# Patient Record
Sex: FEMALE | Race: WHITE | URBAN - METROPOLITAN AREA
[De-identification: names, ages, dates, MRNs, and addresses within clinical notes are randomized per-mention and may not be internally consistent; named-entity substitution may affect disease eponyms.]

---

## 2020-07-17 ENCOUNTER — HOSPITAL ENCOUNTER (OUTPATIENT)
Age: 40
Setting detail: SPECIMEN
Discharge: HOME | End: 2020-07-17
Payer: MEDICARE

## 2020-07-18 LAB
ABSOLUTE EOS #: 0.16 K/UL (ref 0–0.44)
ABSOLUTE IMMATURE GRANULOCYTE: 0.03 K/UL (ref 0–0.3)
ABSOLUTE LYMPH #: 3.31 K/UL (ref 1.1–3.7)
ABSOLUTE MONO #: 0.59 K/UL (ref 0.1–1.2)
ALBUMIN SERPL-MCNC: 4.4 G/DL (ref 3.5–5.2)
ALBUMIN/GLOBULIN RATIO: 1.9 (ref 1–2.5)
ALP BLD-CCNC: 67 U/L (ref 35–104)
ALT SERPL-CCNC: 9 U/L (ref 5–33)
ANION GAP SERPL CALCULATED.3IONS-SCNC: 15 MMOL/L (ref 9–17)
AST SERPL-CCNC: 12 U/L
BASOPHILS # BLD: 1 % (ref 0–2)
BASOPHILS ABSOLUTE: 0.05 K/UL (ref 0–0.2)
BILIRUB SERPL-MCNC: 0.57 MG/DL (ref 0.3–1.2)
BUN BLDV-MCNC: 9 MG/DL (ref 6–20)
BUN/CREAT BLD: NORMAL (ref 9–20)
CALCIUM SERPL-MCNC: 9.5 MG/DL (ref 8.6–10.4)
CHLORIDE BLD-SCNC: 103 MMOL/L (ref 98–107)
CHOLESTEROL, FASTING: 187 MG/DL
CHOLESTEROL/HDL RATIO: 4.9
CO2: 21 MMOL/L (ref 20–31)
CREAT SERPL-MCNC: 0.68 MG/DL (ref 0.5–0.9)
DIFFERENTIAL TYPE: NORMAL
EOSINOPHILS RELATIVE PERCENT: 2 % (ref 1–4)
GFR AFRICAN AMERICAN: >60 ML/MIN
GFR NON-AFRICAN AMERICAN: >60 ML/MIN
GFR SERPL CREATININE-BSD FRML MDRD: NORMAL ML/MIN/{1.73_M2}
GFR SERPL CREATININE-BSD FRML MDRD: NORMAL ML/MIN/{1.73_M2}
GLUCOSE BLD-MCNC: 95 MG/DL (ref 70–99)
HCT VFR BLD CALC: 42.2 % (ref 36.3–47.1)
HDLC SERPL-MCNC: 38 MG/DL
HEMOGLOBIN: 13.7 G/DL (ref 11.9–15.1)
IMMATURE GRANULOCYTES: 0 %
LDL CHOLESTEROL: 126 MG/DL (ref 0–130)
LYMPHOCYTES # BLD: 34 % (ref 24–43)
MCH RBC QN AUTO: 30.8 PG (ref 25.2–33.5)
MCHC RBC AUTO-ENTMCNC: 32.5 G/DL (ref 28.4–34.8)
MCV RBC AUTO: 94.8 FL (ref 82.6–102.9)
MONOCYTES # BLD: 6 % (ref 3–12)
NRBC AUTOMATED: 0 PER 100 WBC
PDW BLD-RTO: 12.9 % (ref 11.8–14.4)
PLATELET # BLD: 207 K/UL (ref 138–453)
PLATELET ESTIMATE: NORMAL
PMV BLD AUTO: 12.4 FL (ref 8.1–13.5)
POTASSIUM SERPL-SCNC: 3.9 MMOL/L (ref 3.7–5.3)
RBC # BLD: 4.45 M/UL (ref 3.95–5.11)
RBC # BLD: NORMAL 10*6/UL
SEG NEUTROPHILS: 57 % (ref 36–65)
SEGMENTED NEUTROPHILS ABSOLUTE COUNT: 5.47 K/UL (ref 1.5–8.1)
SODIUM BLD-SCNC: 139 MMOL/L (ref 135–144)
TOTAL PROTEIN: 6.7 G/DL (ref 6.4–8.3)
TRIGLYCERIDE, FASTING: 117 MG/DL
TSH SERPL DL<=0.05 MIU/L-ACNC: 1.2 MIU/L (ref 0.3–5)
VLDLC SERPL CALC-MCNC: ABNORMAL MG/DL (ref 1–30)
WBC # BLD: 9.6 K/UL (ref 3.5–11.3)
WBC # BLD: NORMAL 10*3/UL

## 2020-07-19 LAB
ESTIMATED AVERAGE GLUCOSE: 105 MG/DL
HBA1C MFR BLD: 5.3 % (ref 4–6)

## 2020-07-24 ENCOUNTER — HOSPITAL ENCOUNTER (OUTPATIENT)
Age: 40
Setting detail: SPECIMEN
Discharge: HOME | End: 2020-07-24
Payer: MEDICARE

## 2020-07-27 LAB
DIRECT EXAM: ABNORMAL
Lab: ABNORMAL
SPECIMEN DESCRIPTION: ABNORMAL

## 2020-07-29 LAB
CYTOLOGY REPORT: NORMAL
HPV SAMPLE: ABNORMAL
HPV, GENOTYPE 16: DETECTED
HPV, GENOTYPE 18: NOT DETECTED
HPV, HIGH RISK OTHER: NOT DETECTED
HPV, INTERPRETATION: ABNORMAL
SPECIMEN DESCRIPTION: ABNORMAL

## 2020-10-23 ENCOUNTER — HOSPITAL ENCOUNTER (OUTPATIENT)
Age: 40
Setting detail: SPECIMEN
Discharge: HOME | End: 2020-10-23
Payer: MEDICARE

## 2020-10-27 LAB — SURGICAL PATHOLOGY REPORT: NORMAL

## 2023-01-06 ENCOUNTER — HOSPITAL ENCOUNTER (OUTPATIENT)
Age: 43
Setting detail: SPECIMEN
Discharge: HOME | End: 2023-01-06

## 2023-01-07 LAB
ABSOLUTE EOS #: 0.09 K/UL (ref 0–0.44)
ABSOLUTE IMMATURE GRANULOCYTE: 0.03 K/UL (ref 0–0.3)
ABSOLUTE LYMPH #: 3.41 K/UL (ref 1.1–3.7)
ABSOLUTE MONO #: 0.6 K/UL (ref 0.1–1.2)
ALBUMIN SERPL-MCNC: 4 G/DL (ref 3.5–5.2)
ALBUMIN/GLOBULIN RATIO: 1.7 (ref 1–2.5)
ALP BLD-CCNC: 73 U/L (ref 35–104)
ALT SERPL-CCNC: 6 U/L (ref 5–33)
ANION GAP SERPL CALCULATED.3IONS-SCNC: 9 MMOL/L (ref 9–17)
AST SERPL-CCNC: 9 U/L
BASOPHILS # BLD: 0 % (ref 0–2)
BASOPHILS ABSOLUTE: 0.04 K/UL (ref 0–0.2)
BILIRUB SERPL-MCNC: 0.3 MG/DL (ref 0.3–1.2)
BUN BLDV-MCNC: 16 MG/DL (ref 6–20)
C-REACTIVE PROTEIN: 4.7 MG/L (ref 0–5)
CALCIUM SERPL-MCNC: 9.3 MG/DL (ref 8.6–10.4)
CHLORIDE BLD-SCNC: 103 MMOL/L (ref 98–107)
CHOLESTEROL, FASTING: 195 MG/DL
CHOLESTEROL/HDL RATIO: 4.4
CO2: 24 MMOL/L (ref 20–31)
CREAT SERPL-MCNC: 0.66 MG/DL (ref 0.5–0.9)
EOSINOPHILS RELATIVE PERCENT: 1 % (ref 1–4)
GFR SERPL CREATININE-BSD FRML MDRD: >60 ML/MIN/1.73M2
GLUCOSE BLD-MCNC: 89 MG/DL (ref 70–99)
HCT VFR BLD CALC: 41.8 % (ref 36.3–47.1)
HDLC SERPL-MCNC: 44 MG/DL
HEMOGLOBIN: 13.8 G/DL (ref 11.9–15.1)
HIV AG/AB: NONREACTIVE
IMMATURE GRANULOCYTES: 0 %
LDL CHOLESTEROL: 135 MG/DL (ref 0–130)
LYMPHOCYTES # BLD: 36 % (ref 24–43)
MCH RBC QN AUTO: 32.5 PG (ref 25.2–33.5)
MCHC RBC AUTO-ENTMCNC: 33 G/DL (ref 28.4–34.8)
MCV RBC AUTO: 98.4 FL (ref 82.6–102.9)
MONOCYTES # BLD: 6 % (ref 3–12)
NRBC AUTOMATED: 0 PER 100 WBC
PDW BLD-RTO: 13.2 % (ref 11.8–14.4)
PLATELET # BLD: 223 K/UL (ref 138–453)
PMV BLD AUTO: 11.6 FL (ref 8.1–13.5)
POTASSIUM SERPL-SCNC: 4.4 MMOL/L (ref 3.7–5.3)
RBC # BLD: 4.25 M/UL (ref 3.95–5.11)
RHEUMATOID FACTOR: 19 IU/ML
SEDIMENTATION RATE, ERYTHROCYTE: 7 MM/HR (ref 0–20)
SEG NEUTROPHILS: 57 % (ref 36–65)
SEGMENTED NEUTROPHILS ABSOLUTE COUNT: 5.39 K/UL (ref 1.5–8.1)
SODIUM BLD-SCNC: 136 MMOL/L (ref 135–144)
T3 TOTAL: 123 NG/DL
TOTAL PROTEIN: 6.4 G/DL (ref 6.4–8.3)
TRIGLYCERIDE, FASTING: 81 MG/DL
TSH SERPL DL<=0.05 MIU/L-ACNC: 0.71 UIU/ML (ref 0.3–5)
URIC ACID: 4.3 MG/DL (ref 2.4–5.7)
WBC # BLD: 9.6 K/UL (ref 3.5–11.3)

## 2023-01-08 LAB
ANTI DNA DOUBLE STRANDED: <0.5 IU/ML
ANTI-NUCLEAR ANTIBODY (ANA): NEGATIVE
CCP IGG ANTIBODIES: <0.4 U/ML (ref 0–7)
ENA ANTIBODIES SCREEN: 0.2 U/ML

## 2023-01-09 ENCOUNTER — TELEPHONE (OUTPATIENT)
Dept: CARDIOLOGY CLINIC | Age: 43
End: 2023-01-09

## 2023-01-09 NOTE — TELEPHONE ENCOUNTER
Received a fax for a referral from [employer]. Attempted to call and schedule but had to leave .

## 2023-01-27 ENCOUNTER — OFFICE VISIT (OUTPATIENT)
Dept: CARDIOLOGY CLINIC | Age: 43
End: 2023-01-27

## 2023-01-27 VITALS
BODY MASS INDEX: 25.24 KG/M2 | DIASTOLIC BLOOD PRESSURE: 58 MMHG | SYSTOLIC BLOOD PRESSURE: 90 MMHG | HEIGHT: 59 IN | WEIGHT: 125.2 LBS | HEART RATE: 71 BPM

## 2023-01-27 DIAGNOSIS — R42 DIZZINESS, NONSPECIFIC: ICD-10-CM

## 2023-01-27 DIAGNOSIS — R01.1 SYSTOLIC EJECTION MURMUR: Primary | ICD-10-CM

## 2023-01-27 DIAGNOSIS — Z72.0 TOBACCO ABUSE: ICD-10-CM

## 2023-01-27 RX ORDER — CETIRIZINE HYDROCHLORIDE 10 MG/1
10 TABLET ORAL AS NEEDED
COMMUNITY

## 2023-01-27 RX ORDER — ALBUTEROL SULFATE 90 UG/1
2 AEROSOL, METERED RESPIRATORY (INHALATION) EVERY 6 HOURS PRN
COMMUNITY

## 2023-01-27 NOTE — PROGRESS NOTES
Chief Complaint   Patient presents with    New Patient     Referred from [facility]     Referred for Heart Murmur    Denied cp, sob, palpitation, or edema    Occasional dizziness supine, sitting or standing- in episodes once in 6 months    Hx of syncope  2009  followed with sizure like activityand evaluated then and none identified per pat    Smokes 1ppd for 22 yrs        FHX  Father had CABG at 72  Grandma had heart ds            Past Surgical History:   Procedure Laterality Date    BRAIN SURGERY  03/2016    removal of two tumors from [address]  11/09/2001    [address]  02/17/2004    [facility]  2012    [address] [facility] OF UTERUS  08/2009    [place] 50    TUBAL LIGATION  02/17/2004    [facility]       Allergies   Allergen Reactions    Topamax [Topiramate]     Codeine Nausea And Vomiting        History reviewed. No pertinent family history. Social History     Socioeconomic History    Marital status: Single     Spouse name: Not on file    Number of children: Not on file    Years of education: Not on file    Highest education level: Not on file   Occupational History    Not on file   Tobacco Use    Smoking status: Every Day     Packs/day: 0.50     Years: 22.00     Pack years: 11.00     Types: Cigarettes     Start date: 2000    Smokeless tobacco: Never   Substance and Sexual Activity    Alcohol use:  Yes     Alcohol/week: 1.0 standard drink     Types: 1 Standard drinks or equivalent per week    Drug use: Yes     Types: Marijuana Alejandra Shan)    Sexual activity: Not on file   Other Topics Concern    Not on file   Social History Narrative    Not on file     Social Determinants of Health     Financial Resource Strain: Not on file   Food Insecurity: Not on file   Transportation Needs: Not on file Physical Activity: Not on file   Stress: Not on file   Social Connections: Not on file   Intimate Partner Violence: Not on file   Housing Stability: Not on file       Current Outpatient Medications   Medication Sig Dispense Refill    albuterol sulfate HFA (VENTOLIN HFA) 108 (90 Base) MCG/ACT inhaler Inhale 2 puffs into the lungs every 6 hours as needed for Wheezing      cetirizine (ZYRTEC) 10 MG tablet Take 10 mg by mouth as needed for Allergies       No current facility-administered medications for this visit. Review of Systems -     General ROS: negative  Psychological ROS: negative  Hematological and Lymphatic ROS: No history of blood clots or bleeding disorder. Respiratory ROS: no cough,  or wheezing, the rest see HPI  Cardiovascular ROS: See HPI  Gastrointestinal ROS: negative  Genito-Urinary ROS: no dysuria, trouble voiding, or hematuria  Musculoskeletal ROS: negative  Neurological ROS: no TIA or stroke symptoms  Dermatological ROS: negative      Blood pressure (!) 90/58, pulse 71, height 4' 11\" (1.499 m), weight 125 lb 3.2 oz (56.8 kg).         Physical Examination:    General appearance - alert, well appearing, and in no distress  HEENT- Pink conjunctiva  , Non-icteri sclera,PERRLA  Mental status - alert, oriented to person, place, and time  Neck - supple, no significant adenopathy, no JVD, or carotid bruits  Chest - clear to auscultation, no wheezes, rales or rhonchi, symmetric air entry  Heart - normal rate, regular rhythm, normal S1, S2, no murmurs, rubs, clicks or gallops  Abdomen - soft, nontender, nondistended, no masses or organomegaly  NAMRATA- no CVA or flank tenderness, no suprapubic tenderness  Neurological - alert, oriented, normal speech, no focal findings or movement disorder noted  Musculoskeletal/limbs - no joint tenderness, deformity or swelling   - peripheral pulses normal, no pedal edema, no clubbing or cyanosis  Skin - normal coloration and turgor, no rashes, no suspicious skin lesions noted  Psych- appropriate mood and affect    Lab  No results for input(s): CKTOTAL, CKMB, CKMBINDEX, TROPONINI in the last 72 hours. CBC:   Lab Results   Component Value Date/Time    WBC 9.6 01/06/2023 03:15 PM    RBC 4.25 01/06/2023 03:15 PM    HGB 13.8 01/06/2023 03:15 PM    HCT 41.8 01/06/2023 03:15 PM    MCV 98.4 01/06/2023 03:15 PM    MCH 32.5 01/06/2023 03:15 PM    MCHC 33.0 01/06/2023 03:15 PM    RDW 13.2 01/06/2023 03:15 PM     01/06/2023 03:15 PM    MPV 11.6 01/06/2023 03:15 PM     BMP:    Lab Results   Component Value Date/Time     01/06/2023 03:15 PM    K 4.4 01/06/2023 03:15 PM     01/06/2023 03:15 PM    CO2 24 01/06/2023 03:15 PM    BUN 16 01/06/2023 03:15 PM    LABALBU 4.0 01/06/2023 03:15 PM    CREATININE 0.66 01/06/2023 03:15 PM    CALCIUM 9.3 01/06/2023 03:15 PM    GFRAA >60 07/17/2020 03:15 PM    LABGLOM >60 01/06/2023 03:15 PM    GLUCOSE 89 01/06/2023 03:15 PM     Hepatic Function Panel:    Lab Results   Component Value Date/Time    ALKPHOS 73 01/06/2023 03:15 PM    ALT 6 01/06/2023 03:15 PM    AST 9 01/06/2023 03:15 PM    PROT 6.4 01/06/2023 03:15 PM    BILITOT 0.3 01/06/2023 03:15 PM    LABALBU 4.0 01/06/2023 03:15 PM     Magnesium:  No results found for: MG  Warfarin PT/INR:  No components found for: PTPATWAR, PTINRWAR  HgBA1c:    Lab Results   Component Value Date/Time    LABA1C 5.3 07/17/2020 03:15 PM     FLP:    Lab Results   Component Value Date/Time    HDL 44 01/06/2023 03:15 PM     TSH:    Lab Results   Component Value Date/Time    TSH 0.71 01/06/2023 03:15 PM       Ekg 1/27/23  Sinus  Rhythm   WITHIN NORMAL LIMITS      Assessment   Diagnosis Orders   1. Systolic ejection murmur best in aortic area  ECHO Complete 2D W Doppler W Color      2. Dizziness, nonspecific  ECHO Complete 2D W Doppler W Color      3.  Tobacco abuse  ECHO Complete 2D W Doppler W Color          Plan     Continue the current treatment and with constant vigilance to changes in symptoms and also any potential side effects. Return for care or seek medical attention immediately if symptoms got worse and/or develop new symptoms. ESM best in aortic area 3/6  Probably function  Dizziness nonsp  Echo      Smoking: discussed with the patient the importance of smoke cessation especially with the risk of CAD. D/w the pat the plan of care    patient is advised to exercise 30 min s a day three times a week and about weight loss ,balance diet and     More fruits and vegetables . Discussed use, benefit, and side effects of prescribed medications. All patient questions answered. Pt voiced understanding. Instructed to continue current medications, diet and exercise. Continue risk factor modification and medical management. Patient agreed with treatment plan. Follow up as directed.     RTC 2 months      Bhupendra Roth, [facility]

## 2023-02-07 ENCOUNTER — HOSPITAL ENCOUNTER (OUTPATIENT)
Dept: NON INVASIVE DIAGNOSTICS | Age: 43
Discharge: HOME | End: 2023-02-07
Payer: MEDICAID

## 2023-02-07 DIAGNOSIS — R01.1 SYSTOLIC EJECTION MURMUR: ICD-10-CM

## 2023-02-07 DIAGNOSIS — R42 DIZZINESS, NONSPECIFIC: ICD-10-CM

## 2023-02-07 DIAGNOSIS — Z72.0 TOBACCO ABUSE: ICD-10-CM

## 2023-02-07 LAB
LV EF: 65 %
LVEF MODALITY: NORMAL

## 2023-02-07 PROCEDURE — 93306 TTE W/DOPPLER COMPLETE: CPT

## 2023-04-14 PROBLEM — I35.8 AORTIC DIASTOLIC MURMUR: Status: ACTIVE | Noted: 2023-04-14

## 2023-04-14 PROBLEM — I35.1 MODERATE AORTIC REGURGITATION: Status: ACTIVE | Noted: 2023-04-14

## 2023-05-02 ENCOUNTER — OFFICE VISIT (OUTPATIENT)
Dept: RHEUMATOLOGY | Age: 43
End: 2023-05-02
Payer: MEDICAID

## 2023-05-02 ENCOUNTER — HOSPITAL ENCOUNTER (OUTPATIENT)
Dept: GENERAL RADIOLOGY | Age: 43
Discharge: HOME | End: 2023-05-02
Payer: MEDICAID

## 2023-05-02 ENCOUNTER — HOSPITAL ENCOUNTER (OUTPATIENT)
Age: 43
Discharge: HOME | End: 2023-05-02
Payer: MEDICAID

## 2023-05-02 VITALS
HEIGHT: 59 IN | OXYGEN SATURATION: 99 % | WEIGHT: 120 LBS | HEART RATE: 64 BPM | SYSTOLIC BLOOD PRESSURE: 116 MMHG | BODY MASS INDEX: 24.19 KG/M2 | DIASTOLIC BLOOD PRESSURE: 74 MMHG

## 2023-05-02 DIAGNOSIS — R53.83 OTHER FATIGUE: Primary | ICD-10-CM

## 2023-05-02 DIAGNOSIS — M25.50 POLYARTHRALGIA: ICD-10-CM

## 2023-05-02 DIAGNOSIS — R63.4 WEIGHT LOSS: ICD-10-CM

## 2023-05-02 DIAGNOSIS — R76.8 RHEUMATOID FACTOR POSITIVE: ICD-10-CM

## 2023-05-02 DIAGNOSIS — R53.83 OTHER FATIGUE: ICD-10-CM

## 2023-05-02 DIAGNOSIS — B35.3 TINEA PEDIS OF LEFT FOOT: ICD-10-CM

## 2023-05-02 DIAGNOSIS — L60.1 ONYCHOLYSIS: ICD-10-CM

## 2023-05-02 LAB
ALBUMIN SERPL BCG-MCNC: 4.8 G/DL (ref 3.5–5.1)
ALP SERPL-CCNC: 73 U/L (ref 38–126)
ALT SERPL W/O P-5'-P-CCNC: < 5 U/L (ref 11–66)
ANION GAP SERPL CALC-SCNC: 16 MEQ/L (ref 8–16)
AST SERPL-CCNC: 11 U/L (ref 5–40)
BASOPHILS ABSOLUTE: 0 THOU/MM3 (ref 0–0.1)
BASOPHILS NFR BLD AUTO: 0.5 %
BILIRUB SERPL-MCNC: 0.7 MG/DL (ref 0.3–1.2)
BUN SERPL-MCNC: 10 MG/DL (ref 7–22)
CALCIUM SERPL-MCNC: 9.7 MG/DL (ref 8.5–10.5)
CHLORIDE SERPL-SCNC: 106 MEQ/L (ref 98–111)
CO2 SERPL-SCNC: 21 MEQ/L (ref 23–33)
CREAT SERPL-MCNC: 0.6 MG/DL (ref 0.4–1.2)
CRP SERPL-MCNC: < 0.3 MG/DL (ref 0–1)
DEPRECATED RDW RBC AUTO: 46.5 FL (ref 35–45)
EOSINOPHIL NFR BLD AUTO: 0.7 %
EOSINOPHILS ABSOLUTE: 0.1 THOU/MM3 (ref 0–0.4)
ERYTHROCYTE [DISTWIDTH] IN BLOOD BY AUTOMATED COUNT: 13.1 % (ref 11.5–14.5)
ERYTHROCYTE [SEDIMENTATION RATE] IN BLOOD BY WESTERGREN METHOD: 9 MM/HR (ref 0–20)
GFR SERPL CREATININE-BSD FRML MDRD: > 60 ML/MIN/1.73M2
GLUCOSE SERPL-MCNC: 85 MG/DL (ref 70–108)
HAV IGM SER QL: NEGATIVE
HBV CORE IGM SERPL QL IA: NEGATIVE
HBV SURFACE AG SERPL QL IA: NEGATIVE
HCT VFR BLD AUTO: 41.6 % (ref 37–47)
HCV IGG SERPL QL IA: NEGATIVE
HGB BLD-MCNC: 13.4 GM/DL (ref 12–16)
IMM GRANULOCYTES # BLD AUTO: 0.03 THOU/MM3 (ref 0–0.07)
IMM GRANULOCYTES NFR BLD AUTO: 0.3 %
LYMPHOCYTES ABSOLUTE: 3.2 THOU/MM3 (ref 1–4.8)
LYMPHOCYTES NFR BLD AUTO: 34.6 %
MCH RBC QN AUTO: 30.9 PG (ref 26–33)
MCHC RBC AUTO-ENTMCNC: 32.2 GM/DL (ref 32.2–35.5)
MCV RBC AUTO: 95.9 FL (ref 81–99)
MONOCYTES ABSOLUTE: 0.5 THOU/MM3 (ref 0.4–1.3)
MONOCYTES NFR BLD AUTO: 5 %
NEUTROPHILS NFR BLD AUTO: 58.9 %
NRBC BLD AUTO-RTO: 0 /100 WBC
PLATELET # BLD AUTO: 211 THOU/MM3 (ref 130–400)
PMV BLD AUTO: 10.9 FL (ref 9.4–12.4)
POTASSIUM SERPL-SCNC: 4 MEQ/L (ref 3.5–5.2)
PROT SERPL-MCNC: 6.8 G/DL (ref 6.1–8)
RBC # BLD AUTO: 4.34 MILL/MM3 (ref 4.2–5.4)
RHEUMATOID FACT SERPL-ACNC: 12 IU/ML (ref 0–13)
SEGMENTED NEUTROPHILS ABSOLUTE COUNT: 5.4 THOU/MM3 (ref 1.8–7.7)
SODIUM SERPL-SCNC: 143 MEQ/L (ref 135–145)
WBC # BLD AUTO: 9.2 THOU/MM3 (ref 4.8–10.8)

## 2023-05-02 PROCEDURE — 99204 OFFICE O/P NEW MOD 45 MIN: CPT | Performed by: INTERNAL MEDICINE

## 2023-05-02 PROCEDURE — 80053 COMPREHEN METABOLIC PANEL: CPT

## 2023-05-02 PROCEDURE — 86430 RHEUMATOID FACTOR TEST QUAL: CPT

## 2023-05-02 PROCEDURE — 86592 SYPHILIS TEST NON-TREP QUAL: CPT

## 2023-05-02 PROCEDURE — 87389 HIV-1 AG W/HIV-1&-2 AB AG IA: CPT

## 2023-05-02 PROCEDURE — 80074 ACUTE HEPATITIS PANEL: CPT

## 2023-05-02 PROCEDURE — 36415 COLL VENOUS BLD VENIPUNCTURE: CPT

## 2023-05-02 PROCEDURE — 85025 COMPLETE CBC W/AUTO DIFF WBC: CPT

## 2023-05-02 PROCEDURE — 86140 C-REACTIVE PROTEIN: CPT

## 2023-05-02 PROCEDURE — 86480 TB TEST CELL IMMUN MEASURE: CPT

## 2023-05-02 PROCEDURE — 86200 CCP ANTIBODY: CPT

## 2023-05-02 PROCEDURE — 86611 BARTONELLA ANTIBODY: CPT

## 2023-05-02 PROCEDURE — 72100 X-RAY EXAM L-S SPINE 2/3 VWS: CPT

## 2023-05-02 PROCEDURE — 86235 NUCLEAR ANTIGEN ANTIBODY: CPT

## 2023-05-02 PROCEDURE — 85651 RBC SED RATE NONAUTOMATED: CPT

## 2023-05-02 PROCEDURE — 71046 X-RAY EXAM CHEST 2 VIEWS: CPT

## 2023-05-02 RX ORDER — KETOCONAZOLE 20 MG/G
CREAM TOPICAL
Qty: 30 G | Refills: 1 | Status: SHIPPED | OUTPATIENT
Start: 2023-05-02

## 2023-05-02 ASSESSMENT — ENCOUNTER SYMPTOMS
EYES NEGATIVE: 1
ABDOMINAL PAIN: 1
DIARRHEA: 1
COUGH: 1

## 2023-05-03 LAB
HIV 1+2 AB+HIV1 P24 AG SERPL QL IA: NONREACTIVE
RPR SER QL: NONREACTIVE

## 2023-05-05 LAB
CYCLIC CITRULLINATED PEPTIDE ANTIBODY IGG: < 0.4 U/ML (ref 0–7)
ENA SS-A IGG SER IA-ACNC: NORMAL
ENA SS-B IGG SER IA-ACNC: 1 AU/ML (ref 0–40)

## 2023-05-06 LAB
B HENSELAE IGG TITR SER IF: NORMAL {TITER}
B HENSELAE IGM TITR SER IF: NORMAL {TITER}
GAMMA INTERFERON BACKGROUND BLD IA-ACNC: 0.05 IU/ML
M TB IFN-G BLD-IMP: POSITIVE
M TB IFN-G CD4+ BCKGRND COR BLD-ACNC: 0.33 IU/ML (ref 0–0.34)
M TB IFN-G CD4+CD8+ BCKGRND COR BLD-ACNC: 0.48 IU/ML (ref 0–0.34)
MITOGEN IGNF BCKGRD COR BLD-ACNC: > 10 IU/ML

## 2023-05-08 ENCOUNTER — TELEPHONE (OUTPATIENT)
Dept: RHEUMATOLOGY | Age: 43
End: 2023-05-08

## 2023-05-08 DIAGNOSIS — M25.50 POLYARTHRALGIA: ICD-10-CM

## 2023-05-08 DIAGNOSIS — R63.4 WEIGHT LOSS: ICD-10-CM

## 2023-05-08 DIAGNOSIS — R76.12 POSITIVE QUANTIFERON-TB GOLD TEST: Primary | ICD-10-CM

## 2023-05-08 NOTE — TELEPHONE ENCOUNTER
+ TB gold   - CXR negative. + patient w/ the c/o arthralgia, fatigue, intermittent lymphadenopathy and unintentional wt loss.    - referral to infection disease for the evaluation and tx

## 2023-05-10 ENCOUNTER — TELEPHONE (OUTPATIENT)
Dept: INFECTIOUS DISEASES | Age: 43
End: 2023-05-10

## 2023-05-10 NOTE — TELEPHONE ENCOUNTER
Called patient to schedule appointment for [facility] clinic per referral. Appointment Monday, June 12 @ 10:30.

## 2023-05-11 NOTE — TELEPHONE ENCOUNTER
Patient called in regarding this. She is wanting to know if she is contagious or not as her coworkers are worried. She stated that if she is contagious she is wanting some suggestions on what she should do, if she should be off work until she sees infectious disease. Stated that if she is not contagious she will need a note stating that as well. Please advise.

## 2023-05-11 NOTE — TELEPHONE ENCOUNTER
Called patient to inform. Stated that she is a  and it is her coworkers that are worried. 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 Also needs to state if she has to wear a mask or if she doesn't need to wear a mask.

## 2023-05-12 ENCOUNTER — TELEPHONE (OUTPATIENT)
Dept: INFECTIOUS DISEASES | Age: 43
End: 2023-05-12

## 2023-05-12 NOTE — TELEPHONE ENCOUNTER
Returned call to patient, reiterated to her that she is not contagious and cannot spread TB due to it being Latent. Patient states, \"My job will not let me work even after Dr. Jony Wright sent a letter saying that she was not contagious and she could work. \". Patient states she will call back if she needs a letter from another doctor.

## 2023-05-12 NOTE — TELEPHONE ENCOUNTER
Patient called in stating that her work called as they received the letter. Although it needs to state that she can return to work in the letter.

## 2023-05-15 ENCOUNTER — TELEPHONE (OUTPATIENT)
Dept: INFECTIOUS DISEASES | Age: 43
End: 2023-05-15

## 2023-05-15 NOTE — TELEPHONE ENCOUNTER
Returned call to patient informing her that Manjit Colon CNP will not be able to give her a letter to return to work, due to her not seeing her in the clinic yet. Patient has a appointment June 12 at [facility]. Patient voiced understanding.

## 2023-06-09 ENCOUNTER — TELEPHONE (OUTPATIENT)
Dept: INFECTIOUS DISEASES | Age: 43
End: 2023-06-09

## 2023-06-12 PROBLEM — Z22.7 TB LUNG, LATENT: Status: ACTIVE | Noted: 2023-06-12

## 2023-08-01 ENCOUNTER — TELEPHONE (OUTPATIENT)
Dept: RHEUMATOLOGY | Age: 43
End: 2023-08-01

## 2023-08-01 NOTE — TELEPHONE ENCOUNTER
Pt was scheduled with you for Aug. 2, but appointment was canceled due to her having [place]. She does have new insurance and the next available appointment is not until October. Will it be ok for her to see [place] since her three month f/u appointment was scheduled with you.

## 2023-08-02 ENCOUNTER — OFFICE VISIT (OUTPATIENT)
Dept: RHEUMATOLOGY | Age: 43
End: 2023-08-02
Payer: COMMERCIAL

## 2023-08-02 VITALS
HEIGHT: 59 IN | DIASTOLIC BLOOD PRESSURE: 60 MMHG | OXYGEN SATURATION: 99 % | WEIGHT: 119.05 LBS | SYSTOLIC BLOOD PRESSURE: 122 MMHG | BODY MASS INDEX: 24 KG/M2 | HEART RATE: 73 BPM

## 2023-08-02 DIAGNOSIS — B35.3 TINEA PEDIS OF LEFT FOOT: ICD-10-CM

## 2023-08-02 DIAGNOSIS — R53.83 OTHER FATIGUE: ICD-10-CM

## 2023-08-02 DIAGNOSIS — L60.1 ONYCHOLYSIS: ICD-10-CM

## 2023-08-02 DIAGNOSIS — M25.50 POLYARTHRALGIA: ICD-10-CM

## 2023-08-02 DIAGNOSIS — R76.12 POSITIVE QUANTIFERON-TB GOLD TEST: Primary | ICD-10-CM

## 2023-08-02 PROCEDURE — 99214 OFFICE O/P EST MOD 30 MIN: CPT | Performed by: INTERNAL MEDICINE

## 2023-08-02 ASSESSMENT — ENCOUNTER SYMPTOMS
ABDOMINAL PAIN: 1
RESPIRATORY NEGATIVE: 1
EYES NEGATIVE: 1

## 2023-08-02 NOTE — PATIENT INSTRUCTIONS
Try otc nexium or zantanc to help prevent gastric ulcerations, gastritis.  If you are taking the ibuprofen every day

## 2023-08-07 ENCOUNTER — TELEPHONE (OUTPATIENT)
Dept: INFECTIOUS DISEASES | Age: 43
End: 2023-08-07

## 2023-08-07 NOTE — TELEPHONE ENCOUNTER
Called patient concerning her voice message left to [facility]. I informed Ajit Fuentes that the provider felt she needed more education on her diagnosis. Ajit Fuentes feels she understood  what she was told but, second guessed herself when her pregnant general practitioner would not see her and sent her to another doctor. Ajit Fuentes was informed I would call her if the medications were sent in. She voiced understanding.

## 2023-08-09 ENCOUNTER — TELEPHONE (OUTPATIENT)
Dept: INFECTIOUS DISEASES | Age: 43
End: 2023-08-09

## 2023-08-09 DIAGNOSIS — Z22.7 TB LUNG, LATENT: Primary | ICD-10-CM

## 2023-08-09 RX ORDER — ISONIAZID 300 MG/1
300 TABLET ORAL DAILY
Qty: 30 TABLET | Refills: 0 | Status: SHIPPED | OUTPATIENT
Start: 2023-08-09

## 2023-08-09 RX ORDER — DIPHENHYDRAMINE HYDROCHLORIDE 25 MG/1
25 CAPSULE ORAL DAILY
Qty: 30 TABLET | Refills: 0 | Status: SHIPPED | OUTPATIENT
Start: 2023-08-09

## 2023-08-09 NOTE — TELEPHONE ENCOUNTER
----- Message from Gareth Sweeney LPN sent at 8/4/4879  2:15 PM EDT -----  Regarding: RE: Wants to start antibiotic  I called Vinicius Hassan, she stated she fully understood what you told her but, when her family doctor (who is pregnant) didn't want to take any chances seeing her, made her second guess herself. She doesn't feel she needs a appt. .    Her pharmacy is [facility] [address] [place] [address]. [place] Spence  ----- Message -----  From: BOBO Ambriz CNP  Sent: 8/7/2023   2:07 PM EDT  To: Gareth Sweeney LPN  Subject: RE: Wants to start antibiotic                    She does not need to take any precautions unless she develops signs of active TB (fever, chills, cough, bloody sputum, night sweats, etc). I feel as though I need to see her again to go over what Latent TB is along with what the treatment plan will be as this message leads me to believe that she did not fully understand what we went over. Please schedule her when available. Thank you! Johnny Tiffany      ----- Message -----  From: Gareth Sweeney LPN  Sent: 2/2/2794   1:44 PM EDT  To: BOBO Ambriz CNP  Subject: Wants to start antibiotic                        Domi wants to start her antibiotics & vitamin b12 now since she has a new insurance. She also said in voice message that her family doctor would not see her because of the Latent TB, they had her see one of the associates. 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 She wanted to know what precautions should she take working around her?

## 2023-08-09 NOTE — TELEPHONE ENCOUNTER
Called patient to schedule a appointment per provider, Ariel Reese CNP.   Appointment Monday, September 11 @ 11:00am.

## 2023-08-09 NOTE — TELEPHONE ENCOUNTER
Called patient, reviewed treatment plan for latent TB patient agreeable. Will start Isoniazid, 300 mg by mouth daily and pyridoxine (Vitamin B6), 25 mg by mouth daily. Prescription sent today. ,  Plan for 9 months treatment with monthly CMP for safety monitoring while on medications. Lab requisition to be mailed to patient today, advised her to have completed prior to next visit. Common side effects of medications reviewed. Advised patient to call clinic or seek emergency care should these occur. [place] treatment options were previously discussed at office visit, recommend against these due to higher incidence of adverse effects and medication interactions with these options. Will schedule patient for follow up in 1 month. Advised her to call with any questions or concerns prior to that time. All questions and concerns addressed prior to completion of call.

## 2023-08-17 ENCOUNTER — TELEPHONE (OUTPATIENT)
Dept: INFECTIOUS DISEASES | Age: 43
End: 2023-08-17

## 2023-08-17 NOTE — TELEPHONE ENCOUNTER
Returned call to Jazz concerning her concerns with the antifungal, Isoniazid medication she was prescribed. Patient was worried about the food interactions it listed on the pamphlet and the web site she looked at. I advised her to not take it if she was uncomfortable taking it until she talked to the provider, who would be available next Monday. I told Jazz not all people have those reactions that they listed when taking this medications, that the pharmacy has to list them. Domi agreed with this but, thought she would just wait until she talked to the provider.

## 2023-08-21 ENCOUNTER — OFFICE VISIT (OUTPATIENT)
Dept: RHEUMATOLOGY | Age: 43
End: 2023-08-21
Payer: COMMERCIAL

## 2023-08-21 VITALS
HEART RATE: 84 BPM | OXYGEN SATURATION: 97 % | SYSTOLIC BLOOD PRESSURE: 110 MMHG | BODY MASS INDEX: 23.91 KG/M2 | WEIGHT: 118.6 LBS | DIASTOLIC BLOOD PRESSURE: 60 MMHG | HEIGHT: 59 IN

## 2023-08-21 DIAGNOSIS — M79.18 MYOFASCIAL PAIN SYNDROME, CERVICAL: Primary | ICD-10-CM

## 2023-08-21 PROCEDURE — 99213 OFFICE O/P EST LOW 20 MIN: CPT | Performed by: INTERNAL MEDICINE

## 2023-08-21 RX ORDER — HYDROXYZINE HYDROCHLORIDE 25 MG/1
TABLET, FILM COATED ORAL
COMMUNITY
Start: 2023-08-03

## 2023-08-21 ASSESSMENT — ENCOUNTER SYMPTOMS
RESPIRATORY NEGATIVE: 1
GASTROINTESTINAL NEGATIVE: 1
EYES NEGATIVE: 1

## 2023-08-21 NOTE — PROGRESS NOTES
[facility] RHEUMATOLOGY FOLLOW UP NOTE       Date Of Service: 8/21/2023  Provider: Rogelio Rodriguez DO, DO  PCP: BOBO Jiménez - CNP   Name: Garry Salas   MRN: [id number]      Subjective     Chief Complaint   Patient presents with    Follow-up        Garry Salas  is a(n)42 y.o. female here for the f/u evaluation of Migraines , THC use, tobacco depednence (1/2 to 3/4 ppd) , depression  here today with the c/o headahces / neck pain, and Right shoulder pain. -- taking  INH and pyridoxine for latent TB     -- worsening neck pain w/ shooting pain from the base of the mt into the head , neck, shoulder region. Mostly in the evenings. Pain can increase up to 6.5/10 over the past week. Aggravating - watching TV at night and prolonged reading with neck flexion. Denies arm numbness/tingling, fevers. REVIEW OF SYSTEMS: (ROS)    Review of Systems   Constitutional: Negative. HENT: Negative. Eyes: Negative. Respiratory: Negative. Cardiovascular: Negative. Gastrointestinal: Negative. Endocrine: Negative. Genitourinary: Negative. Skin: Negative. Neurological: Negative. Hematological: Negative. PmHx:  has a past medical history of Anxiety, Carpal tunnel syndrome, Heart murmur, and Leaky heart valve. Social History:  reports that she has been smoking cigarettes. She started smoking about 23 years ago. She has a 16.50 pack-year smoking history. She has never used smokeless tobacco. She reports current alcohol use of about 1.0 standard drink per week. She reports current drug use. Drug: Marijuana Rissa Tobias).     Allergies   Allergen Reactions    Topamax [Topiramate]     Codeine Nausea And Vomiting         Current Outpatient Medications:     hydrOXYzine HCl (ATARAX) 25 MG tablet, , Disp: , Rfl:     isoniazid (NYDRAZID) 300 MG tablet, Take 1 tablet by mouth daily, Disp: 30 tablet, Rfl: 0    vitamin B-6 (PYRIDOXINE) 25 MG tablet, Take 1 tablet by mouth daily,

## 2023-08-24 ENCOUNTER — TELEPHONE (OUTPATIENT)
Dept: INFECTIOUS DISEASES | Age: 43
End: 2023-08-24

## 2023-08-24 NOTE — TELEPHONE ENCOUNTER
----- Message from Kendra Loco LPN sent at 4/54/6845  3:19 PM EDT -----  Naya Webbjoana left a message and wanted to know what food she should avoid. She hasn't started the atb., because she wanted to talk to you first about the food. You were on vacation when she first call about it.       Tyronesabra Angelo - # [phone]    Brea

## 2023-08-24 NOTE — TELEPHONE ENCOUNTER
Called patient, reviewed dietary restrictions while on Isoniazid. Reviewed recommendations to avoid foods containing tyramine (e.g., aged cheese, cured meats such as sausages and salami, hector beans, sauerkraut, soy sauce, beer, or red wine) or histamine (e.g., skipjack, tuna, mackerel, salmon) during treatment with isoniazid. Patient verbalized understanding. All questions and concerns addressed prior to completion of call.

## 2023-09-08 ENCOUNTER — TELEPHONE (OUTPATIENT)
Dept: INFECTIOUS DISEASES | Age: 43
End: 2023-09-08

## 2023-09-11 ENCOUNTER — OFFICE VISIT (OUTPATIENT)
Dept: INFECTIOUS DISEASES | Age: 43
End: 2023-09-11

## 2023-09-11 VITALS
WEIGHT: 122 LBS | TEMPERATURE: 98.2 F | HEIGHT: 60 IN | BODY MASS INDEX: 23.95 KG/M2 | OXYGEN SATURATION: 99 % | HEART RATE: 74 BPM

## 2023-09-11 DIAGNOSIS — Z22.7 TB LUNG, LATENT: Primary | ICD-10-CM

## 2023-09-11 RX ORDER — ISONIAZID 300 MG/1
300 TABLET ORAL DAILY
Qty: 30 TABLET | Refills: 0 | Status: SHIPPED | OUTPATIENT
Start: 2023-09-11

## 2023-09-11 RX ORDER — DIPHENHYDRAMINE HYDROCHLORIDE 25 MG/1
25 CAPSULE ORAL DAILY
Qty: 30 TABLET | Refills: 0 | Status: SHIPPED | OUTPATIENT
Start: 2023-09-11

## 2023-09-11 NOTE — PATIENT INSTRUCTIONS
Visit Discharge/Physician Orders: Take Isoniazid (300 mg) and Vitamin B6 (25 mg) daily as ordered. Planned completion date 5/2024. Avoid foods containing tyramine (e.g., aged cheese, cured meats such as sausages and salami, hector beans, sauerkraut, soy sauce, beer, or red wine) or histamine (e.g., skipjack, tuna, mackerel, salmon) during treatment with isoniazid. Have labwork completed monthly while on medication.      -While undergoing treatment, it is important to avoid drinking alcohol and taking acetaminophen (Tylenol). Both of these substances can make the liver work harder, potentially increasing the risk of liver injury from the medications.    -Signs of liver injury may include unexplained tiredness, loss of appetite, nausea, vomiting, dark-colored urine, jaundice (yellowing of the skin or the white portion of the eye), fatigue, abdominal pain, or, rarely, unexplained bruises. Anyone who experiences one or more of these problems while taking any of these medications should stop their medication immediately and notify their health care provider      Keep next scheduled appointment. Please give 24 hour notice if unable to keep appointment. [phone]    If you experience any new onset or worsening symptoms, please call the Infectious Disease Clinic at [phone]. This line is monitored twice weekly. If you need medical attention outside of business hours, please contact your Primary Care Doctor or go to the nearest emergency room.

## 2023-09-11 NOTE — PROGRESS NOTES
Reviewed plan for Isoniazid, 300 mg by mouth daily and pyridoxine, 25 mg by mouth daily for 9 months with monthly CMP for safety monitoring while on medications. Common side effects of medications reviewed. No indications of active TB today. No need for isolation or quarantine. Reviewed signs and symptoms of active TB. Advised patient to call clinic or seek emergency care should these occur. Education hand-outs provided latent TB. Patient encouraged to call with any questions or concerns. Advised her to call clinic with her decision regarding treatment once made. Follow up: 4 weeks. Call clinic with any needs or concerns prior to scheduled visit. Plan of care as above reviewed with patient who verbalizes understanding. All questions and concerns addressed prior to completion of visit. Please see attached Discharge Instructions    Written patient dismissal instructions given to patient and signed by patient or POA.              Electronically signed by BOBO Rodriguez CNP on 9/11/2023 at 11:12 AM

## 2023-10-05 ENCOUNTER — TELEPHONE (OUTPATIENT)
Dept: INFECTIOUS DISEASES | Age: 43
End: 2023-10-05

## 2023-10-09 ENCOUNTER — OFFICE VISIT (OUTPATIENT)
Dept: INFECTIOUS DISEASES | Age: 43
End: 2023-10-09
Payer: COMMERCIAL

## 2023-10-09 VITALS
TEMPERATURE: 98.1 F | RESPIRATION RATE: 20 BRPM | SYSTOLIC BLOOD PRESSURE: 94 MMHG | HEART RATE: 71 BPM | BODY MASS INDEX: 23.95 KG/M2 | WEIGHT: 122 LBS | HEIGHT: 60 IN | DIASTOLIC BLOOD PRESSURE: 58 MMHG | OXYGEN SATURATION: 97 %

## 2023-10-09 DIAGNOSIS — Z22.7 TB LUNG, LATENT: Primary | ICD-10-CM

## 2023-10-09 DIAGNOSIS — R76.12 POSITIVE QUANTIFERON-TB GOLD TEST: ICD-10-CM

## 2023-10-09 PROCEDURE — 99214 OFFICE O/P EST MOD 30 MIN: CPT | Performed by: NURSE PRACTITIONER

## 2023-10-09 RX ORDER — ISONIAZID 300 MG/1
300 TABLET ORAL DAILY
Qty: 30 TABLET | Refills: 0 | Status: SHIPPED | OUTPATIENT
Start: 2023-10-09

## 2023-10-09 RX ORDER — DIPHENHYDRAMINE HYDROCHLORIDE 25 MG/1
25 CAPSULE ORAL DAILY
Qty: 30 TABLET | Refills: 0 | Status: SHIPPED | OUTPATIENT
Start: 2023-10-09

## 2023-10-09 NOTE — PATIENT INSTRUCTIONS
Visit Discharge/Physician Orders: Take Isoniazid (300 mg) and Vitamin B6 (25 mg) daily as ordered. Avoid foods containing tyramine (e.g., aged cheese, cured meats such as sausages and salami, hector beans, sauerkraut, soy sauce, beer, or red wine) or histamine (e.g., skipjack, tuna, mackerel, salmon) during treatment with isoniazid. Have labwork completed monthly while on medication.      -While undergoing treatment, it is important to avoid drinking alcohol and taking acetaminophen (Tylenol). Both of these substances can make the liver work harder, potentially increasing the risk of liver injury from the medications.    -Signs of liver injury may include unexplained tiredness, loss of appetite, nausea, vomiting, dark-colored urine, jaundice (yellowing of the skin or the white portion of the eye), fatigue, abdominal pain, or, rarely, unexplained bruises. Anyone who experiences one or more of these problems while taking any of these medications should stop their medication immediately and notify their health care provider     Labs: Have completed in 1 month    Keep next scheduled appointment. Please give 24 hour notice if unable to keep appointment. [phone]    If you experience any new onset or worsening symptoms, please call the Infectious Disease Clinic at [phone]. This line is monitored twice weekly. If you need medical attention outside of business hours, please contact your Primary Care Doctor or go to the nearest emergency room.

## 2023-10-09 NOTE — PROGRESS NOTES
encouraged to call with any questions or concerns. Advised her to call clinic with her decision regarding treatment once made. Patient reports feeling tired today due to increased activity and decreased sleep over the weekend. She denies any associated symptoms, states that feeling tired started this morning. Encouraged her to get some rest, call clinic and/or follow up with PCP should symptoms persist or with any new symptoms. Follow up: 4 weeks. Call clinic with any needs or concerns prior to scheduled visit. Plan of care as above reviewed with patient who verbalizes understanding. All questions and concerns addressed prior to completion of visit. Please see attached Discharge Instructions    Written patient dismissal instructions given to patient and signed by patient or POA.              Electronically signed by BOBO Hernandez CNP on 10/9/2023 at 9:35 AM

## 2023-11-06 ENCOUNTER — OFFICE VISIT (OUTPATIENT)
Dept: RHEUMATOLOGY | Age: 43
End: 2023-11-06
Payer: COMMERCIAL

## 2023-11-06 ENCOUNTER — OFFICE VISIT (OUTPATIENT)
Dept: INFECTIOUS DISEASES | Age: 43
End: 2023-11-06
Payer: COMMERCIAL

## 2023-11-06 VITALS
SYSTOLIC BLOOD PRESSURE: 108 MMHG | WEIGHT: 121.91 LBS | HEART RATE: 65 BPM | HEIGHT: 59 IN | DIASTOLIC BLOOD PRESSURE: 58 MMHG | BODY MASS INDEX: 24.58 KG/M2 | OXYGEN SATURATION: 97 %

## 2023-11-06 VITALS
HEART RATE: 77 BPM | BODY MASS INDEX: 24.6 KG/M2 | SYSTOLIC BLOOD PRESSURE: 102 MMHG | TEMPERATURE: 97.6 F | WEIGHT: 122 LBS | HEIGHT: 59 IN | DIASTOLIC BLOOD PRESSURE: 60 MMHG | OXYGEN SATURATION: 96 % | RESPIRATION RATE: 16 BRPM

## 2023-11-06 DIAGNOSIS — M79.18 MYOFASCIAL PAIN SYNDROME, CERVICAL: Primary | ICD-10-CM

## 2023-11-06 DIAGNOSIS — Z22.7 TB LUNG, LATENT: Primary | ICD-10-CM

## 2023-11-06 DIAGNOSIS — R76.12 POSITIVE QUANTIFERON-TB GOLD TEST: ICD-10-CM

## 2023-11-06 DIAGNOSIS — B35.3 TINEA PEDIS OF LEFT FOOT: ICD-10-CM

## 2023-11-06 PROCEDURE — 99214 OFFICE O/P EST MOD 30 MIN: CPT | Performed by: NURSE PRACTITIONER

## 2023-11-06 PROCEDURE — 99203 OFFICE O/P NEW LOW 30 MIN: CPT | Performed by: INTERNAL MEDICINE

## 2023-11-06 RX ORDER — DIPHENHYDRAMINE HYDROCHLORIDE 25 MG/1
25 CAPSULE ORAL DAILY
Qty: 30 TABLET | Refills: 1 | Status: SHIPPED | OUTPATIENT
Start: 2023-11-06

## 2023-11-06 RX ORDER — ISONIAZID 300 MG/1
300 TABLET ORAL DAILY
Qty: 30 TABLET | Refills: 1 | Status: SHIPPED | OUTPATIENT
Start: 2023-11-06

## 2023-11-06 ASSESSMENT — ENCOUNTER SYMPTOMS
CONSTIPATION: 1
EYES NEGATIVE: 1
RESPIRATORY NEGATIVE: 1

## 2023-11-06 NOTE — PROGRESS NOTES
[facility] Infectious Disease         Consult Note      Sanju Graham  MEDICAL RECORD NUMBER:  [id number]  AGE: 44 y.o. GENDER: female  : 1980  EPISODE DATE:  2023    Subjective:     Chief Complaint   Patient presents with    Follow-up     TB lung, latent         HISTORY of PRESENT ILLNESS HPI     Sanju Graham is a 44 y.o. female established patient referred by Dr. Rajwinder Randall, who presents today for infectious disease evaluation. History of Infectious Disease Context: Patient presents to clinic today for follow up for latent TB, having started treatment with Isoniazid 23. She reported some GI upset on starting medication, today reports this as resolved. Diagnosis found during work-up by Dr. Rajwinder Randall for potential autoimmune disease. Patient states that her family [place]er carriers of TB. \"  Notes that her grandmother had active TB, her great-great grandmother  from TB and multiple relatives have been tested and found to have latent TB. Chest Xray was obtained showing no abnormalities. She denies any cough, hemoptysis. Patient is a current 0.75 PPD smoker. Works as a . Today, she reports feeling tired and having increased pain to her joints and muscles. She voices frustration with ongoing, unknown diagnosis. States she is scheduled to follow up with Dr. Rajwinder Randall this afternoon to discuss. She denies any other signs or symptoms of active TB infection. States she recently saw dermatologist who plans on starting her on antifungal medication for her feet and hands after completion of latent TB treatment. She denies any further needs or concerns.       PAST MEDICAL HISTORY        Diagnosis Date    Anxiety     Carpal tunnel syndrome     Bilateral    Heart murmur     Leaky heart valve        PAST SURGICAL HISTORY    Past Surgical History:   Procedure Laterality Date    BRAIN SURGERY  2016    removal of two tumors from Pineal gland/[address] [address]

## 2023-11-06 NOTE — PATIENT INSTRUCTIONS
Visit Discharge/Physician Orders: Take Isoniazid (300 mg) and Vitamin B6 (25 mg) daily as ordered. Avoid foods containing tyramine (e.g., aged cheese, cured meats such as sausages and salami, hector beans, sauerkraut, soy sauce, beer, or red wine) or histamine (e.g., skipjack, tuna, mackerel, salmon) during treatment with isoniazid. Have labwork completed monthly while on medication.      -While undergoing treatment, it is important to avoid drinking alcohol and taking acetaminophen (Tylenol). Both of these substances can make the liver work harder, potentially increasing the risk of liver injury from the medications.    -Signs of liver injury may include unexplained tiredness, loss of appetite, nausea, vomiting, dark-colored urine, jaundice (yellowing of the skin or the white portion of the eye), fatigue, abdominal pain, or, rarely, unexplained bruises. Anyone who experiences one or more of these problems while taking any of these medications should stop their medication immediately and notify their health care provider      Keep next scheduled appointment. Please give 24 hour notice if unable to keep appointment. [phone]    If you experience any new onset or worsening symptoms, please call the Infectious Disease Clinic at [phone]. This line is monitored twice weekly. If you need medical attention outside of business hours, please contact your Primary Care Doctor or go to the nearest emergency room.

## 2023-11-06 NOTE — PROGRESS NOTES
[facility] RHEUMATOLOGY FOLLOW UP NOTE       Date Of Service: 11/6/2023  Provider: Rogelio Rodriguez DO ,   PCP: BOBO Jiménez - CNP   Name: Garry Salas   MRN: [id number]      Subjective     Chief Complaint   Patient presents with    Follow-up     3 mo f/u, +TB, Myofascial pain syndrome, cervical     ~Saw Infectious Disease this morning, Stated her dermatologist said she has fungial infection on bilat feet, Rt hand. Wants to discuss possible neurology referral for possible MS. Pain     3-4/10 - Lt side of neck, and generalized         Garry Salas  is a(n)43 y.o. female here for the f/u evaluation of Migraines , THC use, tobacco depednence (1/2 to 3/4 ppd) , depression  here today with the c/o headahces / neck pain, and Right shoulder pain. -- taking  INH and pyridoxine for latent TB   -- Dermatology evaluation (Dr. Rahul Torres office) diagnosed with tinea pedis - started on lamisil cream for feet and nail polish for toes. Ongoing genearlized pain neck pain w/ shooting pain from the base of the mt into the head , neck, bilateral upper and lwoer extremities, lower back. . Mostly in the evenings. Pain can increase up to 7/10 over the past week. Aggravating - watching TV at night and prolonged reading with neck flexion, increaed activity. Allevaiting: rest.  Am stiffness lasting a couple hours. Relief w/ activity and movement. - left jaw pain / lateral neck pain started last Friday 11/3/2023. Throbbing pain in the bilateral arms and legs - worse with activity. 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 Denies arm numbness/tingling, fevers. Smoking around 1/2 ppd. REVIEW OF SYSTEMS: (ROS)    Review of Systems   Constitutional:  Positive for fatigue. HENT: Negative. Eyes: Negative. Respiratory: Negative. Cardiovascular: Negative. Gastrointestinal:  Positive for constipation. Endocrine: Negative.

## 2023-12-04 ENCOUNTER — OFFICE VISIT (OUTPATIENT)
Dept: INFECTIOUS DISEASES | Age: 43
End: 2023-12-04

## 2023-12-04 VITALS
SYSTOLIC BLOOD PRESSURE: 115 MMHG | BODY MASS INDEX: 24.8 KG/M2 | HEIGHT: 59 IN | OXYGEN SATURATION: 98 % | DIASTOLIC BLOOD PRESSURE: 58 MMHG | HEART RATE: 71 BPM | RESPIRATION RATE: 18 BRPM | TEMPERATURE: 98.4 F | WEIGHT: 123 LBS

## 2023-12-04 DIAGNOSIS — R76.12 POSITIVE QUANTIFERON-TB GOLD TEST: ICD-10-CM

## 2023-12-04 DIAGNOSIS — Z22.7 TB LUNG, LATENT: Primary | ICD-10-CM

## 2023-12-04 RX ORDER — ISONIAZID 300 MG/1
300 TABLET ORAL DAILY
Qty: 30 TABLET | Refills: 1 | Status: SHIPPED | OUTPATIENT
Start: 2023-12-04

## 2023-12-04 RX ORDER — DIPHENHYDRAMINE HYDROCHLORIDE 25 MG/1
25 CAPSULE ORAL DAILY
Qty: 30 TABLET | Refills: 1 | Status: SHIPPED | OUTPATIENT
Start: 2023-12-04

## 2023-12-04 NOTE — PATIENT INSTRUCTIONS
Visit Discharge/Physician Orders: Take Isoniazid (300 mg) and Vitamin B6 (25 mg) daily as ordered. Planned completion date May 2024. Avoid foods containing tyramine (e.g., aged cheese, cured meats such as sausages and salami, hector beans, sauerkraut, soy sauce, beer, or red wine) or histamine (e.g., skipjack, tuna, mackerel, salmon) during treatment with isoniazid. Have labwork completed monthly while on medication.      -While undergoing treatment, it is important to avoid drinking alcohol and taking acetaminophen (Tylenol). Both of these substances can make the liver work harder, potentially increasing the risk of liver injury from the medications.    -Signs of liver injury may include unexplained tiredness, loss of appetite, nausea, vomiting, dark-colored urine, jaundice (yellowing of the skin or the white portion of the eye), fatigue, abdominal pain, or, rarely, unexplained bruises. Anyone who experiences one or more of these problems while taking any of these medications should stop their medication immediately and notify their health care provider      Keep next scheduled appointment. Please give 24 hour notice if unable to keep appointment. [phone]    If you experience any new onset or worsening symptoms, please call the Infectious Disease Clinic at [phone]. This line is monitored twice weekly. If you need medical attention outside of business hours, please contact your Primary Care Doctor or go to the nearest emergency room.

## 2023-12-04 NOTE — PROGRESS NOTES
[facility]         Consult Note      Tiffanie Cummins  MEDICAL RECORD NUMBER:  [id number]  AGE: 44 y.o. GENDER: female  : 1980  EPISODE DATE:  2023    Subjective:     Chief Complaint   Patient presents with    Follow-up     TB lung, latent       HISTORY of PRESENT ILLNESS HPI     Tiffanie Cummins is a 44 y.o. female established patient referred by Dr. Max Singh, who presents today for infectious disease evaluation. History of Infectious Disease Context: Patient presents to clinic today for follow up for latent TB, having started treatment with Isoniazid 23. End date May 2024. She reported some GI upset on starting medication, today reports this as resolved. Diagnosis found during work-up by Dr. Max Singh for potential autoimmune disease. Patient states that her family Alfredo Fried carriers of TB. \"  Notes that her grandmother had active TB, her great-great grandmother  from TB and multiple relatives have been tested and found to have latent TB. Chest Xray was obtained showing no abnormalities. She denies any cough, hemoptysis. Patient is a current 0.75 PPD smoker. Works as a . She denies any other signs or symptoms of active TB infection. Today, she reports decreased anxiety. She also states that she was diagnosed with fibromyalgia per Dr. Max Singh at his last visit, is following with PCP for this. PCP did recently repeat chest xray, states she is unsure why. Report reviewed, negative. She denies any further needs or concerns.     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

## 2024-02-05 ENCOUNTER — OFFICE VISIT (OUTPATIENT)
Dept: INFECTIOUS DISEASES | Age: 44
End: 2024-02-05
Payer: COMMERCIAL

## 2024-02-05 VITALS
WEIGHT: 119.6 LBS | TEMPERATURE: 98 F | OXYGEN SATURATION: 99 % | DIASTOLIC BLOOD PRESSURE: 54 MMHG | SYSTOLIC BLOOD PRESSURE: 100 MMHG | RESPIRATION RATE: 18 BRPM | HEART RATE: 69 BPM | BODY MASS INDEX: 23.48 KG/M2 | HEIGHT: 60 IN

## 2024-02-05 DIAGNOSIS — Z22.7 TB LUNG, LATENT: Primary | ICD-10-CM

## 2024-02-05 PROCEDURE — 99214 OFFICE O/P EST MOD 30 MIN: CPT | Performed by: NURSE PRACTITIONER

## 2024-02-05 RX ORDER — ISONIAZID 300 MG/1
300 TABLET ORAL DAILY
Qty: 30 TABLET | Refills: 1 | Status: SHIPPED | OUTPATIENT
Start: 2024-02-05

## 2024-02-05 NOTE — PROGRESS NOTES
electronically signed by DR MADISON DIA on 5/2/2023 3:47 PM           Exam Ended: 05/02/23 15:28 EDT Last Resulted: 05/02/23 15:47 EDT              CBC:   Lab Results   Component Value Date/Time    WBC 9.2 05/02/2023 02:51 PM    HGB 13.4 05/02/2023 02:51 PM    HCT 41.6 05/02/2023 02:51 PM    MCV 95.9 05/02/2023 02:51 PM     05/02/2023 02:51 PM     BMP:   Lab Results   Component Value Date/Time     05/02/2023 02:51 PM    K 4.0 05/02/2023 02:51 PM     05/02/2023 02:51 PM    CO2 21 05/02/2023 02:51 PM    BUN 10 05/02/2023 02:51 PM    CREATININE 0.6 05/02/2023 02:51 PM     PT/INR: No results found for: \"PROTIME\", \"INR\"  Prealbumin: No results found for: \"PREALBUMIN\"  Albumin:  Lab Results   Component Value Date/Time    LABALBU 4.8 05/02/2023 02:51 PM     Sed Rate:  Lab Results   Component Value Date/Time    SEDRATE 9 05/02/2023 02:51 PM     CRP:   Lab Results   Component Value Date/Time    CRP < 0.30 05/02/2023 02:51 PM     Micro: No results found for: \"BC\"   Hemoglobin A1C:   Lab Results   Component Value Date/Time    LABA1C 5.3 07/17/2020 03:15 PM       Assessment:     -Latent TB    Patient Active Problem List   Diagnosis Code    Dizziness, nonspecific R42    Tobacco abuse Z72.0    Systolic ejection murmur best in aortic area R01.1    Moderate aortic regurgitation I35.1    early Aortic diastolic murmur 2/6 I35.8    TB lung, latent Z22.7     Plan:     Patient examined and evaluated    -Latent TB- Patient continues on Isoniazid and vitamin B-6 tolerating well, no adverse effects reported.  Reviewed plan for Isoniazid, 300 mg by mouth daily and pyridoxine, 25 mg by mouth daily for 9 months with monthly CMP for safety monitoring while on medications.  End date may 2024.  Prescription for INH sent today.  Common side effects of medications reviewed, patient advised to call should these occur. Most recent blood work reviewed, at baseline.  No indications of active TB today.  No need for isolation or

## 2024-02-05 NOTE — PATIENT INSTRUCTIONS
Visit Discharge/Physician Orders:    Take Isoniazid (300 mg) and Vitamin B6 (25 mg) daily as ordered. Planned completion date May 2024.      Avoid foods containing tyramine (e.g., aged cheese, cured meats such as sausages and salami, hector beans, sauerkraut, soy sauce, beer, or red wine) or histamine (e.g., skipjack, tuna, mackerel, salmon) during treatment with isoniazid.    Have labwork completed monthly while on medication.      -While undergoing treatment, it is important to avoid drinking alcohol and taking acetaminophen (Tylenol). Both of these substances can make the liver work harder, potentially increasing the risk of liver injury from the medications.    -Signs of liver injury may include unexplained tiredness, loss of appetite, nausea, vomiting, dark-colored urine, jaundice (yellowing of the skin or the white portion of the eye), fatigue, abdominal pain, or, rarely, unexplained bruises. Anyone who experiences one or more of these problems while taking any of these medications should stop their medication immediately and notify their health care provider      Keep next scheduled appointment. Please give 24 hour notice if unable to keep appointment. [phone]    If you experience any new onset or worsening symptoms, please call the Infectious Disease Clinic at [phone].  This line is monitored twice weekly.      If you need medical attention outside of business hours, please contact your Primary Care Doctor or go to the nearest emergency room.

## 2024-04-11 ENCOUNTER — TELEPHONE (OUTPATIENT)
Dept: INFECTIOUS DISEASES | Age: 44
End: 2024-04-11

## 2024-04-15 ENCOUNTER — OFFICE VISIT (OUTPATIENT)
Dept: INFECTIOUS DISEASES | Age: 44
End: 2024-04-15

## 2024-04-15 ENCOUNTER — TELEPHONE (OUTPATIENT)
Dept: INFECTIOUS DISEASES | Age: 44
End: 2024-04-15

## 2024-04-15 VITALS
SYSTOLIC BLOOD PRESSURE: 100 MMHG | RESPIRATION RATE: 20 BRPM | OXYGEN SATURATION: 99 % | DIASTOLIC BLOOD PRESSURE: 64 MMHG | HEIGHT: 60 IN | TEMPERATURE: 98.2 F | BODY MASS INDEX: 23.91 KG/M2 | WEIGHT: 121.8 LBS | HEART RATE: 71 BPM

## 2024-04-15 DIAGNOSIS — Z22.7 TB LUNG, LATENT: Primary | ICD-10-CM

## 2024-04-15 PROBLEM — Z86.15 HISTORY OF LATENT TUBERCULOSIS: Status: ACTIVE | Noted: 2023-06-12

## 2024-04-15 NOTE — TELEPHONE ENCOUNTER
Returned call to Domi.  Advised her to come in for her appointment and to get her blood work completed afterwards.  She voiced understanding.

## 2024-04-15 NOTE — PATIENT INSTRUCTIONS
Visit Discharge/Physician Orders:    Stop isoniazid 4/20/24 as scheduled.  No further treatment needed.    Keep next scheduled appointment. Please give 24 hour notice if unable to keep appointment. [phone]    If you experience any new onset or worsening symptoms, please call the Infectious Disease Clinic at [phone].  This line is monitored twice weekly.      If you need medical attention outside of business hours, please contact your Primary Care Doctor or go to the nearest emergency room.      The TB skin test or blood test will always be “positive” despite the completed treatment  regimen, so an alternate form of screening for TB should be done in the future. The  “positive” reaction indicates the presence of antibodies against TB. Antibodies are  formed by the immune system to fight infection when germs first enter the body and  continue to be present even after treatment.    Although it is unlikely to develop tuberculosis, please see a physician for an evaluation  if the following symptoms occur: weight loss, fatigue, fevers, night sweats, chronic  cough, or coughing up blood.

## 2024-04-15 NOTE — PROGRESS NOTES
- 145 mmol/L 137   Potassium 3.5 - 5.1 mmol/L 3.6   Chloride 98 - 108 mmol/L 108   Bicarbonate 21 - 32 mmol/L 24   Anion Gap 10 - 20 mmol/L 9 Low    Glucose 65 - 99 mg/dL 101 High    BUN 8 - 25 mg/dL 11   Creatinine 0.40 - 1.10 mg/dL 0.75   eGFR >=60 mL/min/1.73 m2 102   Comment: Estimated GFR was calculated using the 2021 CKD-EPI creatinine equation.   BUN/Creatinine Ratio 10.0 - 20.0 14.7   Total Protein 6.0 - 8.0 g/dL 7.0   Albumin 3.2 - 5.2 g/dL 3.8   Calcium 8.4 - 10.2 mg/dL 9.4   Alkaline Phosphatase 40 - 150 U/L 63   AST 0-35 U/L 38 High    Total Bilirubin 0.0 - 1.3 mg/dL 0.8   ALT 14 - 65 U/L 47   Resulting Agency  [facility]   Narrative  Performed by [facility]  [facility] has implemented the eGFR calculation approach that does not have a coefficient for race that conforms to the NKF-ASN Task Force Recommendations.    Specimen Collected: 12/01/23 14:04    Performed by: [facility] Last Resulted: 12/01/23 14:33   Received From: [facility]  Result Received: 12/04/23 11:33       Contains abnormal data Comprehensive Metabolic Panel  Order: [id number]   Ref Range & Units 10/6/23 1333   Sodium 135 - 145 mmol/L 137    Potassium 3.5 - 5.1 mmol/L 4.1    Chloride 98 - 108 mmol/L 107    Bicarbonate 21 - 32 mmol/L 28    Anion Gap 10 - 20 mmol/L 6 Low     Glucose 65 - 99 mg/dL 93    BUN 8 - 25 mg/dL 15    Creatinine 0.40 - 1.10 mg/dL 0.79    eGFR >=60 mL/min/1.73 m2 96    Comment: Estimated GFR was calculated using the 2021 CKD-EPI creatinine equation.   BUN/Creatinine Ratio 10.0 - 20.0 19.0    Total Protein 6.0 - 8.0 g/dL 7.1    Albumin 3.2 - 5.2 g/dL 3.9    Calcium 8.4 - 10.2 mg/dL 9.5    Alkaline Phosphatase 40 - 150 U/L 69    AST 0-35 U/L 13    Total Bilirubin 0.0 - 1.3 mg/dL 0.6    ALT 14 - 65 U/L 26    Resulting Agency  [facility]   Narrative  Performed by [facility]  [facility] has implemented the eGFR calculation approach that does not have a coefficient for race that conforms to the